# Patient Record
Sex: FEMALE | Race: WHITE | Employment: UNEMPLOYED | URBAN - METROPOLITAN AREA
[De-identification: names, ages, dates, MRNs, and addresses within clinical notes are randomized per-mention and may not be internally consistent; named-entity substitution may affect disease eponyms.]

---

## 2018-01-26 ENCOUNTER — HOSPITAL ENCOUNTER (OUTPATIENT)
Age: 23
Discharge: HOME | End: 2018-01-26
Attending: FAMILY MEDICINE
Payer: MEDICAID

## 2018-01-26 VITALS
RESPIRATION RATE: 12 BRPM | TEMPERATURE: 98 F | SYSTOLIC BLOOD PRESSURE: 131 MMHG | DIASTOLIC BLOOD PRESSURE: 81 MMHG | HEART RATE: 112 BPM | OXYGEN SATURATION: 98 %

## 2018-01-26 DIAGNOSIS — H10.33 ACUTE BACTERIAL CONJUNCTIVITIS OF BOTH EYES: Primary | ICD-10-CM

## 2018-01-26 LAB — POCT RAPID STREP: NEGATIVE

## 2018-01-26 PROCEDURE — 99214 OFFICE O/P EST MOD 30 MIN: CPT

## 2018-01-26 PROCEDURE — 87430 STREP A AG IA: CPT | Performed by: FAMILY MEDICINE

## 2018-01-26 PROCEDURE — 87081 CULTURE SCREEN ONLY: CPT | Performed by: FAMILY MEDICINE

## 2018-01-26 RX ORDER — TOBRAMYCIN 3 MG/ML
2 SOLUTION/ DROPS OPHTHALMIC
Qty: 1 BOTTLE | Refills: 0 | Status: SHIPPED | OUTPATIENT
Start: 2018-01-26 | End: 2018-01-28 | Stop reason: ALTCHOICE

## 2018-01-26 NOTE — ED PROVIDER NOTES
Patient Seen in: [id number] [facility]    History   Patient presents with:  Eye Problem    Stated Complaint: eye redness    HPI    Patient 71-year-old female with discharge from both eyes bilaterally . This is been going on for a few days.   Pa vitals reviewed.         ED Course     Labs Reviewed   POCT RAPID STREP - Normal   GRP A STREP CULT, THROAT       ED Course as of Jan 26 1556  ------------------------------------------------------------       MDM     35 weeks pregnant, will start her on To

## 2018-01-27 ENCOUNTER — CHARTING TRANS (OUTPATIENT)
Dept: OTHER | Age: 23
End: 2018-01-27

## 2018-01-28 ENCOUNTER — HOSPITAL ENCOUNTER (OUTPATIENT)
Age: 23
Discharge: HOME | End: 2018-01-28
Payer: MEDICAID

## 2018-01-28 VITALS
WEIGHT: 179 LBS | OXYGEN SATURATION: 99 % | RESPIRATION RATE: 16 BRPM | HEART RATE: 103 BPM | TEMPERATURE: 98 F | DIASTOLIC BLOOD PRESSURE: 91 MMHG | SYSTOLIC BLOOD PRESSURE: 144 MMHG

## 2018-01-28 DIAGNOSIS — H10.33 ACUTE BACTERIAL CONJUNCTIVITIS OF BOTH EYES: Primary | ICD-10-CM

## 2018-01-28 PROCEDURE — 99213 OFFICE O/P EST LOW 20 MIN: CPT

## 2018-01-28 PROCEDURE — 99214 OFFICE O/P EST MOD 30 MIN: CPT

## 2018-01-28 RX ORDER — MOXIFLOXACIN 5 MG/ML
1 SOLUTION/ DROPS OPHTHALMIC 3 TIMES DAILY
Qty: 2 BOTTLE | Refills: 0 | Status: SHIPPED | OUTPATIENT
Start: 2018-01-28 | End: 2018-02-04

## 2018-01-28 NOTE — ED INITIAL ASSESSMENT (HPI)
Pt states was seen 2 days for same pink eye. States has worsened with drops prescibed.  Pt is 35 weeks pregnant

## 2018-01-28 NOTE — ED PROVIDER NOTES
Patient Seen in: [id number] [facility]    History   Patient presents with:  Conjunctivitis    Stated Complaint: PINK EYE     HPI    Morenita Busby is a 78-year-old female who presents today for bilateral conjunctivitis.   She was seen here 2 days ago and foreign body present in the left eye. Right conjunctiva is injected. Left conjunctiva is injected. Bilateral conjunctivae are inflamed and erythematous. Bilateral lids are swollen without any erythema or hordeolum noted.   Purulent drainage is appreciate diagnosis)    Disposition:  Discharge  1/28/2018  3:35 pm    Follow-up:  Guido Camarenama [phone]    Call in 1 day          Medications Prescribed:  Current Discharge Medication List    START taking these medicati